# Patient Record
Sex: FEMALE | Race: BLACK OR AFRICAN AMERICAN | NOT HISPANIC OR LATINO | Employment: OTHER | URBAN - METROPOLITAN AREA
[De-identification: names, ages, dates, MRNs, and addresses within clinical notes are randomized per-mention and may not be internally consistent; named-entity substitution may affect disease eponyms.]

---

## 2023-05-18 LAB
ALANINE AMINOTRANSFERASE (SGPT) (U/L) IN SER/PLAS: 13 U/L (ref 7–45)
ALBUMIN (G/DL) IN SER/PLAS: 4.1 G/DL (ref 3.4–5)
ALKALINE PHOSPHATASE (U/L) IN SER/PLAS: 45 U/L (ref 33–136)
ANION GAP IN SER/PLAS: 13 MMOL/L (ref 10–20)
ASPARTATE AMINOTRANSFERASE (SGOT) (U/L) IN SER/PLAS: 18 U/L (ref 9–39)
BILIRUBIN TOTAL (MG/DL) IN SER/PLAS: 0.6 MG/DL (ref 0–1.2)
CALCIDIOL (25 OH VITAMIN D3) (NG/ML) IN SER/PLAS: 116 NG/ML
CALCIUM (MG/DL) IN SER/PLAS: 9.5 MG/DL (ref 8.6–10.6)
CARBON DIOXIDE, TOTAL (MMOL/L) IN SER/PLAS: 27 MMOL/L (ref 21–32)
CHLORIDE (MMOL/L) IN SER/PLAS: 105 MMOL/L (ref 98–107)
CHOLESTEROL (MG/DL) IN SER/PLAS: 210 MG/DL (ref 0–199)
CHOLESTEROL IN HDL (MG/DL) IN SER/PLAS: 53.4 MG/DL
CHOLESTEROL/HDL RATIO: 3.9
CREATININE (MG/DL) IN SER/PLAS: 0.62 MG/DL (ref 0.5–1.05)
ERYTHROCYTE DISTRIBUTION WIDTH (RATIO) BY AUTOMATED COUNT: 13.4 % (ref 11.5–14.5)
ERYTHROCYTE MEAN CORPUSCULAR HEMOGLOBIN CONCENTRATION (G/DL) BY AUTOMATED: 32.4 G/DL (ref 32–36)
ERYTHROCYTE MEAN CORPUSCULAR VOLUME (FL) BY AUTOMATED COUNT: 96 FL (ref 80–100)
ERYTHROCYTES (10*6/UL) IN BLOOD BY AUTOMATED COUNT: 4.65 X10E12/L (ref 4–5.2)
ESTIMATED AVERAGE GLUCOSE FOR HBA1C: 128 MG/DL
GFR FEMALE: 90 ML/MIN/1.73M2
GLUCOSE (MG/DL) IN SER/PLAS: 95 MG/DL (ref 74–99)
HEMATOCRIT (%) IN BLOOD BY AUTOMATED COUNT: 44.5 % (ref 36–46)
HEMOGLOBIN (G/DL) IN BLOOD: 14.4 G/DL (ref 12–16)
HEMOGLOBIN A1C/HEMOGLOBIN TOTAL IN BLOOD: 6.1 %
LDL: 124 MG/DL (ref 0–99)
LEUKOCYTES (10*3/UL) IN BLOOD BY AUTOMATED COUNT: 7 X10E9/L (ref 4.4–11.3)
NRBC (PER 100 WBCS) BY AUTOMATED COUNT: 0 /100 WBC (ref 0–0)
PLATELETS (10*3/UL) IN BLOOD AUTOMATED COUNT: 160 X10E9/L (ref 150–450)
POTASSIUM (MMOL/L) IN SER/PLAS: 3.7 MMOL/L (ref 3.5–5.3)
PROTEIN TOTAL: 7.5 G/DL (ref 6.4–8.2)
SODIUM (MMOL/L) IN SER/PLAS: 141 MMOL/L (ref 136–145)
TRIGLYCERIDE (MG/DL) IN SER/PLAS: 162 MG/DL (ref 0–149)
UREA NITROGEN (MG/DL) IN SER/PLAS: 12 MG/DL (ref 6–23)
VLDL: 32 MG/DL (ref 0–40)

## 2024-01-11 ENCOUNTER — OFFICE VISIT (OUTPATIENT)
Dept: OPHTHALMOLOGY | Facility: CLINIC | Age: 79
End: 2024-01-11
Payer: MEDICARE

## 2024-01-11 DIAGNOSIS — H40.1133 PRIMARY OPEN-ANGLE GLAUCOMA, BILATERAL, SEVERE STAGE: Primary | ICD-10-CM

## 2024-01-11 PROCEDURE — 99214 OFFICE O/P EST MOD 30 MIN: CPT | Performed by: OPHTHALMOLOGY

## 2024-01-11 ASSESSMENT — CONF VISUAL FIELD
OS_INFERIOR_NASAL_RESTRICTION: 1
OS_INFERIOR_TEMPORAL_RESTRICTION: 1
OD_SUPERIOR_NASAL_RESTRICTION: 1
OS_SUPERIOR_TEMPORAL_RESTRICTION: 2
OD_SUPERIOR_TEMPORAL_RESTRICTION: 1
OS_SUPERIOR_NASAL_RESTRICTION: 1

## 2024-01-11 ASSESSMENT — SLIT LAMP EXAM - LIDS
COMMENTS: GOOD POSITION
COMMENTS: GOOD POSITION

## 2024-01-11 ASSESSMENT — EXTERNAL EXAM - RIGHT EYE: OD_EXAM: NORMAL

## 2024-01-11 ASSESSMENT — PACHYMETRY
OS_CT(UM): 505
OD_CT(UM): 478

## 2024-01-11 ASSESSMENT — CUP TO DISC RATIO
OD_RATIO: 0.9
OS_RATIO: 0.99

## 2024-01-11 ASSESSMENT — VISUAL ACUITY
OS_SC: CF@1FT
METHOD: SNELLEN - LINEAR
OD_SC: 20/60

## 2024-01-11 ASSESSMENT — TONOMETRY
OS_IOP_MMHG: 15
IOP_METHOD: GOLDMANN APPLANATION
OD_IOP_MMHG: 15

## 2024-01-11 ASSESSMENT — ENCOUNTER SYMPTOMS: EYES NEGATIVE: 1

## 2024-01-11 ASSESSMENT — EXTERNAL EXAM - LEFT EYE: OS_EXAM: NORMAL

## 2024-01-11 NOTE — PROGRESS NOTES
Visual Acuity (Snellen - Linear)         Right Left    Dist sc 20/60 CF@1FT          Tonometry       Tonometry (Goldmann Applanation, 11:04 AM)         Right Left    Pressure 15 15                  Assessment/Plan   Last dilated: 22  Last testin23    1.  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  Pt states that she had cataract and glaucoma surgery, but no internal osteum nor MIGs implant/incision in TM seen.  No trab seen either.  After stopping brimonidine, the follicles are improved.   IOP measurements are VERY sensitive to blepharospasm and digital pressure.      Now that she is off rhopressa and off preservatives, her eyes are much more comfortable.  The conj thickening has nearly resolved and redness is much improved.  Pt did not have SLT b/c she was scared to have it and wanted to see if she got her systemic BP under control if the IOP would improve.  Pt has a lot medication allergies and is reluctant for SLT.         IOP is elevated (MD confirmed) and not controlled given the very thin CCT.  Re-discussed options 1) CPM, 2) SLT.  R/B/A reviewed.  Pt refuses SLT today.        Plan:  cont cosopt PF OU BID             cont zioptan OU QHS             f/u 1 months to re-check IOP     2.  Pseudophakia (PCIOL) OD / Cataract OS:  no PCO seen; not visually significant OS      Plan;  monitor

## 2024-02-08 ENCOUNTER — OFFICE VISIT (OUTPATIENT)
Dept: OPHTHALMOLOGY | Facility: CLINIC | Age: 79
End: 2024-02-08
Payer: MEDICARE

## 2024-02-08 DIAGNOSIS — H40.1133 PRIMARY OPEN-ANGLE GLAUCOMA, BILATERAL, SEVERE STAGE: Primary | ICD-10-CM

## 2024-02-08 PROCEDURE — 99214 OFFICE O/P EST MOD 30 MIN: CPT | Performed by: OPHTHALMOLOGY

## 2024-02-08 RX ORDER — DORZOLAMIDE HYDROCHLORIDE AND TIMOLOL MALEATE 20; 5 MG/ML; MG/ML
SOLUTION/ DROPS OPHTHALMIC
COMMUNITY

## 2024-02-08 RX ORDER — TAFLUPROST OPTHALMIC 0 MG/.3ML
SOLUTION/ DROPS OPHTHALMIC
COMMUNITY
Start: 2022-01-19 | End: 2024-03-18 | Stop reason: SDUPTHER

## 2024-02-08 ASSESSMENT — CUP TO DISC RATIO
OS_RATIO: 0.99
OD_RATIO: 0.9

## 2024-02-08 ASSESSMENT — ENCOUNTER SYMPTOMS
CONSTITUTIONAL NEGATIVE: 0
ALLERGIC/IMMUNOLOGIC NEGATIVE: 0
CARDIOVASCULAR NEGATIVE: 0
PSYCHIATRIC NEGATIVE: 0
ENDOCRINE NEGATIVE: 0
EYES NEGATIVE: 1
MUSCULOSKELETAL NEGATIVE: 0
NEUROLOGICAL NEGATIVE: 0
GASTROINTESTINAL NEGATIVE: 0
HEMATOLOGIC/LYMPHATIC NEGATIVE: 0
RESPIRATORY NEGATIVE: 0

## 2024-02-08 ASSESSMENT — SLIT LAMP EXAM - LIDS
COMMENTS: GOOD POSITION
COMMENTS: GOOD POSITION

## 2024-02-08 ASSESSMENT — TONOMETRY
OS_IOP_MMHG: 19
OD_IOP_MMHG: 15
IOP_METHOD: GOLDMANN APPLANATION

## 2024-02-08 ASSESSMENT — VISUAL ACUITY
METHOD: SNELLEN - LINEAR
OS_SC: CF AT 1'
OD_SC: 20/60

## 2024-02-08 ASSESSMENT — EXTERNAL EXAM - LEFT EYE: OS_EXAM: NORMAL

## 2024-02-08 ASSESSMENT — CONF VISUAL FIELD
OD_SUPERIOR_TEMPORAL_RESTRICTION: 1
OS_SUPERIOR_NASAL_RESTRICTION: 1
OD_SUPERIOR_NASAL_RESTRICTION: 1
OS_INFERIOR_TEMPORAL_RESTRICTION: 1
OS_INFERIOR_NASAL_RESTRICTION: 1
OS_SUPERIOR_TEMPORAL_RESTRICTION: 2

## 2024-02-08 ASSESSMENT — EXTERNAL EXAM - RIGHT EYE: OD_EXAM: NORMAL

## 2024-02-08 ASSESSMENT — PACHYMETRY
OS_CT(UM): 505
OD_CT(UM): 478

## 2024-02-08 NOTE — PROGRESS NOTES
Visual Acuity (Snellen - Linear)         Right Left    Dist sc 20/60 CF at 1'          Tonometry       Tonometry (Goldmann Applanation, 10:51 AM)         Right Left    Pressure 15 19   Double checked with tonopen (tonopen read )                 Assessment/Plan   Last dilated: 22  Last testin23    1.  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  Pt states that she had cataract and glaucoma surgery, but no internal osteum nor MIGs implant/incision in TM seen.  No trab seen either.  After stopping brimonidine, the follicles are improved.   IOP measurements are VERY sensitive to blepharospasm and digital pressure.      Now that she is off rhopressa and off preservatives, her eyes are much more comfortable.  The conj thickening has nearly resolved and redness is much improved.  Pt did not have SLT b/c she was scared to have it and wanted to see if she got her systemic BP under control if the IOP would improve.  Pt has a lot medication allergies and is reluctant for SLT.         IOP is elevated (MD confirmed) and not controlled given the very thin CCT.  Re-discussed options 1) CPM, 2) SLT.  R/B/A reviewed.        Plan:  cont cosopt PF OU BID                cont zioptan OU QHS                f/u 1 months to re-check IOP     2.  Pseudophakia (PCIOL) OD / Cataract OS:  no PCO seen; not visually significant OS      Plan:  monitor

## 2024-03-18 ENCOUNTER — OFFICE VISIT (OUTPATIENT)
Dept: OPHTHALMOLOGY | Facility: CLINIC | Age: 79
End: 2024-03-18
Payer: MEDICARE

## 2024-03-18 ENCOUNTER — PHARMACY VISIT (OUTPATIENT)
Dept: PHARMACY | Facility: CLINIC | Age: 79
End: 2024-03-18
Payer: COMMERCIAL

## 2024-03-18 DIAGNOSIS — H40.1133 PRIMARY OPEN-ANGLE GLAUCOMA, BILATERAL, SEVERE STAGE: Primary | ICD-10-CM

## 2024-03-18 PROCEDURE — RXMED WILLOW AMBULATORY MEDICATION CHARGE

## 2024-03-18 PROCEDURE — 65855 TRABECULOPLASTY LASER SURG: CPT | Mod: LEFT SIDE | Performed by: OPHTHALMOLOGY

## 2024-03-18 RX ORDER — PREDNISOLONE ACETATE 10 MG/ML
1 SUSPENSION/ DROPS OPHTHALMIC 4 TIMES DAILY
Qty: 5 ML | Refills: 1 | Status: SHIPPED | OUTPATIENT
Start: 2024-03-18 | End: 2024-03-18 | Stop reason: SDUPTHER

## 2024-03-18 RX ORDER — PREDNISOLONE ACETATE 10 MG/ML
1 SUSPENSION/ DROPS OPHTHALMIC 4 TIMES DAILY
Qty: 5 ML | Refills: 1 | Status: SHIPPED | OUTPATIENT
Start: 2024-03-18 | End: 2024-04-12

## 2024-03-18 NOTE — PROGRESS NOTES
Not recorded       Assessment/Plan   Last dilated: 22  Last testin23    1.  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  Pt states that she had cataract and glaucoma surgery, but no internal osteum nor MIGs implant/incision in TM seen.  No trab seen either.  After stopping brimonidine, the follicles are improved.   IOP measurements are VERY sensitive to blepharospasm and digital pressure.      Now that she is off rhopressa and off preservatives, her eyes are much more comfortable.  The conj thickening has nearly resolved and redness is much improved.  Pt did not have SLT b/c she was scared to have it and wanted to see if she got her systemic BP under control if the IOP would improve.  Pt has a lot medication allergies and is reluctant for SLT.         IOP is elevated (MD confirmed) and not controlled given the very thin CCT.  Re-discussed options 1) CPM, 2) SLT.  R/B/A reviewed.        Plan:  cont cosopt PF OU BID                cont zioptan OU QHS                proceed with SLT OS today 3/18/24 pre-laser IOP = 19 mmHg    2.  Pseudophakia (PCIOL) OD / Cataract OS:  no PCO seen; not visually significant OS      Plan:  monitor

## 2024-04-15 ENCOUNTER — OFFICE VISIT (OUTPATIENT)
Dept: OPHTHALMOLOGY | Facility: CLINIC | Age: 79
End: 2024-04-15
Payer: MEDICARE

## 2024-04-15 DIAGNOSIS — Z96.1 PSEUDOPHAKIA OF BOTH EYES: ICD-10-CM

## 2024-04-15 DIAGNOSIS — H40.1133 PRIMARY OPEN-ANGLE GLAUCOMA, BILATERAL, SEVERE STAGE: Primary | ICD-10-CM

## 2024-04-15 PROCEDURE — 99213 OFFICE O/P EST LOW 20 MIN: CPT | Performed by: OPHTHALMOLOGY

## 2024-04-15 ASSESSMENT — ENCOUNTER SYMPTOMS
PSYCHIATRIC NEGATIVE: 0
CARDIOVASCULAR NEGATIVE: 0
EYES NEGATIVE: 1
GASTROINTESTINAL NEGATIVE: 0
HEMATOLOGIC/LYMPHATIC NEGATIVE: 0
ENDOCRINE NEGATIVE: 0
ALLERGIC/IMMUNOLOGIC NEGATIVE: 0
RESPIRATORY NEGATIVE: 0
CONSTITUTIONAL NEGATIVE: 0
MUSCULOSKELETAL NEGATIVE: 0
NEUROLOGICAL NEGATIVE: 0

## 2024-04-15 ASSESSMENT — PACHYMETRY
OD_CT(UM): 478
OS_CT(UM): 505

## 2024-04-15 ASSESSMENT — CONF VISUAL FIELD
OS_SUPERIOR_NASAL_RESTRICTION: 1
OS_INFERIOR_TEMPORAL_RESTRICTION: 1
OD_SUPERIOR_TEMPORAL_RESTRICTION: 1
OS_SUPERIOR_TEMPORAL_RESTRICTION: 2
OS_INFERIOR_NASAL_RESTRICTION: 1
OD_SUPERIOR_NASAL_RESTRICTION: 1

## 2024-04-15 ASSESSMENT — TONOMETRY
OS_IOP_MMHG: 18
OD_IOP_MMHG: 17
IOP_METHOD: GOLDMANN APPLANATION

## 2024-04-15 ASSESSMENT — VISUAL ACUITY
OS_SC: CF @ 1FT
OD_SC: 20/50-1
METHOD: SNELLEN - LINEAR

## 2024-04-15 NOTE — PROGRESS NOTES
Visual Acuity (Snellen - Linear)         Right Left    Dist sc 20/50-1 CF @ 1ft    Dist ph sc NI           Tonometry       Tonometry (Goldmann Applanation, 11:22 AM)         Right Left    Pressure 17 18                  Assessment/Plan     Not recorded       Assessment/Plan   Last dilated: 22  Last testin23    1.  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  Pt states that she had cataract and glaucoma surgery, but no internal osteum nor MIGs implant/incision in TM seen.  No trab seen either.  After stopping brimonidine, the follicles are improved.   IOP measurements are VERY sensitive to blepharospasm and digital pressure.      Now that she is off rhopressa and off preservatives, her eyes are much more comfortable.  The conj thickening has nearly resolved and redness is much improved.  Pt did not have SLT b/c she was scared to have it and wanted to see if she got her systemic BP under control if the IOP would improve.  Pt has a lot medication allergies and is reluctant for SLT.         SLT OS today 3/18/24 pre-laser IOP = 19 mmHg -> 1 mmHg early effect        Plan:  cont cosopt PF OU BID                cont zioptan OU QHS                f/u 1 month to see full effect of SLT OS    2.  Pseudophakia (PCIOL) OD / Cataract OS:  no PCO seen; not visually significant OS      Plan:  monitor

## 2024-05-07 ENCOUNTER — APPOINTMENT (OUTPATIENT)
Dept: PRIMARY CARE | Facility: CLINIC | Age: 79
End: 2024-05-07
Payer: MEDICARE

## 2024-05-07 PROBLEM — M25.512 PAIN OF LEFT SHOULDER REGION: Status: RESOLVED | Noted: 2024-05-07 | Resolved: 2024-05-07

## 2024-05-07 PROBLEM — K64.8 INTERNAL HEMORRHOIDS: Status: ACTIVE | Noted: 2024-05-07

## 2024-05-07 PROBLEM — N39.0 URINARY TRACT INFECTION: Status: RESOLVED | Noted: 2024-05-07 | Resolved: 2024-05-07

## 2024-05-07 PROBLEM — R01.1 HEART MURMUR: Status: ACTIVE | Noted: 2024-05-07

## 2024-05-07 PROBLEM — K59.00 CONSTIPATION: Status: ACTIVE | Noted: 2024-05-07

## 2024-05-07 PROBLEM — H25.812 COMBINED FORM OF AGE-RELATED CATARACT, LEFT EYE: Status: ACTIVE | Noted: 2020-08-06

## 2024-05-07 PROBLEM — R19.5 ABNORMAL FECES: Status: ACTIVE | Noted: 2024-05-07

## 2024-05-07 PROBLEM — E78.5 HYPERLIPIDEMIA: Status: ACTIVE | Noted: 2024-05-07

## 2024-05-07 PROBLEM — I34.0 MITRAL VALVE INSUFFICIENCY: Status: ACTIVE | Noted: 2024-05-07

## 2024-05-07 PROBLEM — R07.9 CHEST PAIN: Status: RESOLVED | Noted: 2024-05-07 | Resolved: 2024-05-07

## 2024-05-07 PROBLEM — N95.2 VAGINAL ATROPHY: Status: ACTIVE | Noted: 2024-05-07

## 2024-05-07 PROBLEM — G47.00 INSOMNIA: Status: ACTIVE | Noted: 2024-05-07

## 2024-05-07 PROBLEM — R53.83 FATIGUE: Status: ACTIVE | Noted: 2024-05-07

## 2024-05-07 PROBLEM — Z86.39 HISTORY OF DIABETES MELLITUS: Status: ACTIVE | Noted: 2024-05-07

## 2024-05-07 PROBLEM — R00.2 HEART PALPITATIONS: Status: ACTIVE | Noted: 2024-05-07

## 2024-05-07 PROBLEM — H40.9 END-STAGE GLAUCOMA: Status: ACTIVE | Noted: 2020-08-06

## 2024-05-07 PROBLEM — N39.3 STRESS INCONTINENCE: Status: ACTIVE | Noted: 2024-05-07

## 2024-05-07 PROBLEM — I10 HYPERTENSION: Status: ACTIVE | Noted: 2024-05-07

## 2024-05-07 PROBLEM — M81.0 POST-MENOPAUSAL OSTEOPOROSIS: Status: ACTIVE | Noted: 2024-05-07

## 2024-05-07 PROBLEM — R33.9 URINARY RETENTION: Status: ACTIVE | Noted: 2024-05-07

## 2024-05-07 PROBLEM — E11.9 TYPE 2 DIABETES MELLITUS (MULTI): Status: ACTIVE | Noted: 2024-05-07

## 2024-05-07 RX ORDER — IBUPROFEN 100 MG/5ML
1000 SUSPENSION, ORAL (FINAL DOSE FORM) ORAL DAILY
COMMUNITY

## 2024-05-07 RX ORDER — DOCUSATE SODIUM 100 MG/1
CAPSULE, LIQUID FILLED ORAL
COMMUNITY
Start: 2014-06-27

## 2024-05-07 RX ORDER — NAPROXEN SODIUM 220 MG/1
TABLET, FILM COATED ORAL
COMMUNITY
Start: 2018-03-13

## 2024-05-07 RX ORDER — NIACIN (INOSITOL NIACINATE) 400(500MG)
1 CAPSULE ORAL DAILY
COMMUNITY
Start: 2018-02-13

## 2024-05-07 RX ORDER — PNV NO.95/FERROUS FUM/FOLIC AC 28MG-0.8MG
1 TABLET ORAL DAILY
COMMUNITY
Start: 2018-02-13

## 2024-05-07 RX ORDER — LATANOPROST 50 UG/ML
SOLUTION/ DROPS OPHTHALMIC
COMMUNITY

## 2024-05-07 RX ORDER — HYDROCHLOROTHIAZIDE 12.5 MG/1
1 CAPSULE ORAL DAILY
COMMUNITY

## 2024-05-07 RX ORDER — TRAVOPROST OPHTHALMIC SOLUTION 0.04 MG/ML
SOLUTION OPHTHALMIC
COMMUNITY
Start: 2023-04-17

## 2024-05-07 RX ORDER — CHOLECALCIFEROL (VITAMIN D3) 25 MCG
1 TABLET ORAL DAILY
COMMUNITY

## 2024-05-07 RX ORDER — MULTIVITAMIN
TABLET ORAL
COMMUNITY

## 2024-05-30 ENCOUNTER — OFFICE VISIT (OUTPATIENT)
Dept: OPHTHALMOLOGY | Facility: CLINIC | Age: 79
End: 2024-05-30
Payer: MEDICARE

## 2024-05-30 ENCOUNTER — APPOINTMENT (OUTPATIENT)
Dept: OPHTHALMOLOGY | Facility: CLINIC | Age: 79
End: 2024-05-30
Payer: MEDICARE

## 2024-05-30 DIAGNOSIS — H40.1133 PRIMARY OPEN-ANGLE GLAUCOMA, BILATERAL, SEVERE STAGE: Primary | ICD-10-CM

## 2024-05-30 DIAGNOSIS — Z96.1 PSEUDOPHAKIA OF BOTH EYES: ICD-10-CM

## 2024-05-30 PROCEDURE — 99213 OFFICE O/P EST LOW 20 MIN: CPT | Performed by: OPHTHALMOLOGY

## 2024-05-30 ASSESSMENT — ENCOUNTER SYMPTOMS
GASTROINTESTINAL NEGATIVE: 0
MUSCULOSKELETAL NEGATIVE: 0
ENDOCRINE NEGATIVE: 0
CARDIOVASCULAR NEGATIVE: 0
NEUROLOGICAL NEGATIVE: 0
EYES NEGATIVE: 1
RESPIRATORY NEGATIVE: 0
CONSTITUTIONAL NEGATIVE: 0
PSYCHIATRIC NEGATIVE: 0
HEMATOLOGIC/LYMPHATIC NEGATIVE: 0
ALLERGIC/IMMUNOLOGIC NEGATIVE: 0

## 2024-05-30 ASSESSMENT — PACHYMETRY
OS_CT(UM): 505
OD_CT(UM): 478

## 2024-05-30 ASSESSMENT — VISUAL ACUITY
METHOD: SNELLEN - LINEAR
OD_SC: 20/50
OS_SC: CF@1FT

## 2024-05-30 ASSESSMENT — TONOMETRY
OS_IOP_MMHG: 15
IOP_METHOD: GOLDMANN APPLANATION
OD_IOP_MMHG: 14

## 2024-05-30 NOTE — PROGRESS NOTES
Visual Acuity (Snellen - Linear)         Right Left    Dist sc 20/50 CF@1FT    Dist ph sc NI           Tonometry       Tonometry (Goldmann Applanation, 9:49 AM)         Right Left    Pressure 14 15                  Assessment/Plan   Last dilated: 22  Last testin23    1.  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  Pt states that she had cataract and glaucoma surgery, but no internal osteum nor MIGs implant/incision in TM seen.  No trab seen either.  After stopping brimonidine, the follicles are improved.   IOP measurements are VERY sensitive to blepharospasm and digital pressure.      Now that she is off rhopressa and off preservatives, her eyes are much more comfortable.  The conj thickening has nearly resolved and redness is much improved.  Pt did not have SLT b/c she was scared to have it and wanted to see if she got her systemic BP under control if the IOP would improve.  Pt has a lot medication allergies and is reluctant for SLT.         SLT OS today 3/18/24 pre-laser IOP = 19 mmHg -> 4 mmHg effect.  IOPs are now well controlled      Plan:  cont cosopt PF OU BID                cont zioptan OU QHS                f/u 3 month (HVF, dilation, RNFL)    2.  Pseudophakia (PCIOL) OD / Cataract OS:  no PCO seen; not visually significant OS      Plan:  monitor

## 2024-09-10 ENCOUNTER — APPOINTMENT (OUTPATIENT)
Dept: PRIMARY CARE | Facility: CLINIC | Age: 79
End: 2024-09-10
Payer: MEDICARE

## 2024-09-10 ENCOUNTER — LAB (OUTPATIENT)
Dept: LAB | Facility: LAB | Age: 79
End: 2024-09-10
Payer: MEDICARE

## 2024-09-10 VITALS
WEIGHT: 146 LBS | HEIGHT: 67 IN | HEART RATE: 92 BPM | BODY MASS INDEX: 22.91 KG/M2 | DIASTOLIC BLOOD PRESSURE: 94 MMHG | SYSTOLIC BLOOD PRESSURE: 157 MMHG

## 2024-09-10 DIAGNOSIS — E55.9 VITAMIN D DEFICIENCY: ICD-10-CM

## 2024-09-10 DIAGNOSIS — I10 PRIMARY HYPERTENSION: ICD-10-CM

## 2024-09-10 DIAGNOSIS — R01.1 HEART MURMUR: ICD-10-CM

## 2024-09-10 DIAGNOSIS — Z00.00 MEDICARE ANNUAL WELLNESS VISIT, SUBSEQUENT: ICD-10-CM

## 2024-09-10 DIAGNOSIS — E78.49 OTHER HYPERLIPIDEMIA: ICD-10-CM

## 2024-09-10 DIAGNOSIS — I34.0 NONRHEUMATIC MITRAL VALVE REGURGITATION: ICD-10-CM

## 2024-09-10 DIAGNOSIS — E11.69 TYPE 2 DIABETES MELLITUS WITH OTHER SPECIFIED COMPLICATION, WITHOUT LONG-TERM CURRENT USE OF INSULIN (MULTI): ICD-10-CM

## 2024-09-10 DIAGNOSIS — Z00.00 MEDICARE ANNUAL WELLNESS VISIT, SUBSEQUENT: Primary | ICD-10-CM

## 2024-09-10 DIAGNOSIS — Z00.00 ROUTINE GENERAL MEDICAL EXAMINATION AT HEALTH CARE FACILITY: ICD-10-CM

## 2024-09-10 LAB
25(OH)D3 SERPL-MCNC: 94 NG/ML (ref 30–100)
ALBUMIN SERPL BCP-MCNC: 4.6 G/DL (ref 3.4–5)
ALP SERPL-CCNC: 55 U/L (ref 33–136)
ALT SERPL W P-5'-P-CCNC: 16 U/L (ref 7–45)
ANION GAP SERPL CALC-SCNC: 15 MMOL/L (ref 10–20)
APPEARANCE UR: CLEAR
AST SERPL W P-5'-P-CCNC: 18 U/L (ref 9–39)
BILIRUB SERPL-MCNC: 0.5 MG/DL (ref 0–1.2)
BILIRUB UR STRIP.AUTO-MCNC: NEGATIVE MG/DL
BUN SERPL-MCNC: 11 MG/DL (ref 6–23)
CALCIUM SERPL-MCNC: 9.6 MG/DL (ref 8.6–10.6)
CHLORIDE SERPL-SCNC: 101 MMOL/L (ref 98–107)
CHOLEST SERPL-MCNC: 231 MG/DL (ref 0–199)
CHOLESTEROL/HDL RATIO: 4.1
CO2 SERPL-SCNC: 27 MMOL/L (ref 21–32)
COLOR UR: COLORLESS
CREAT SERPL-MCNC: 0.74 MG/DL (ref 0.5–1.05)
CREAT UR-MCNC: 21 MG/DL (ref 20–320)
EGFRCR SERPLBLD CKD-EPI 2021: 81 ML/MIN/1.73M*2
ERYTHROCYTE [DISTWIDTH] IN BLOOD BY AUTOMATED COUNT: 13.3 % (ref 11.5–14.5)
EST. AVERAGE GLUCOSE BLD GHB EST-MCNC: 140 MG/DL
GLUCOSE SERPL-MCNC: 124 MG/DL (ref 74–99)
GLUCOSE UR STRIP.AUTO-MCNC: NORMAL MG/DL
HBA1C MFR BLD: 6.5 %
HCT VFR BLD AUTO: 44.2 % (ref 36–46)
HDLC SERPL-MCNC: 55.8 MG/DL
HGB BLD-MCNC: 14.4 G/DL (ref 12–16)
KETONES UR STRIP.AUTO-MCNC: NEGATIVE MG/DL
LDLC SERPL CALC-MCNC: 141 MG/DL
LEUKOCYTE ESTERASE UR QL STRIP.AUTO: NEGATIVE
MCH RBC QN AUTO: 30.9 PG (ref 26–34)
MCHC RBC AUTO-ENTMCNC: 32.6 G/DL (ref 32–36)
MCV RBC AUTO: 95 FL (ref 80–100)
MICROALBUMIN UR-MCNC: 27.9 MG/L
MICROALBUMIN/CREAT UR: 132.9 UG/MG CREAT
NITRITE UR QL STRIP.AUTO: NEGATIVE
NON HDL CHOLESTEROL: 175 MG/DL (ref 0–149)
NRBC BLD-RTO: 0 /100 WBCS (ref 0–0)
PH UR STRIP.AUTO: 7 [PH]
PLATELET # BLD AUTO: 184 X10*3/UL (ref 150–450)
POTASSIUM SERPL-SCNC: 4 MMOL/L (ref 3.5–5.3)
PROT SERPL-MCNC: 7.9 G/DL (ref 6.4–8.2)
PROT UR STRIP.AUTO-MCNC: NEGATIVE MG/DL
RBC # BLD AUTO: 4.66 X10*6/UL (ref 4–5.2)
RBC # UR STRIP.AUTO: NEGATIVE /UL
SODIUM SERPL-SCNC: 139 MMOL/L (ref 136–145)
SP GR UR STRIP.AUTO: 1
TRIGL SERPL-MCNC: 172 MG/DL (ref 0–149)
TSH SERPL-ACNC: 3.4 MIU/L (ref 0.44–3.98)
UROBILINOGEN UR STRIP.AUTO-MCNC: NORMAL MG/DL
VLDL: 34 MG/DL (ref 0–40)
WBC # BLD AUTO: 7.7 X10*3/UL (ref 4.4–11.3)

## 2024-09-10 PROCEDURE — 3080F DIAST BP >= 90 MM HG: CPT | Performed by: INTERNAL MEDICINE

## 2024-09-10 PROCEDURE — 1160F RVW MEDS BY RX/DR IN RCRD: CPT | Performed by: INTERNAL MEDICINE

## 2024-09-10 PROCEDURE — 1124F ACP DISCUSS-NO DSCNMKR DOCD: CPT | Performed by: INTERNAL MEDICINE

## 2024-09-10 PROCEDURE — G0439 PPPS, SUBSEQ VISIT: HCPCS | Performed by: INTERNAL MEDICINE

## 2024-09-10 PROCEDURE — 1159F MED LIST DOCD IN RCRD: CPT | Performed by: INTERNAL MEDICINE

## 2024-09-10 PROCEDURE — 36415 COLL VENOUS BLD VENIPUNCTURE: CPT

## 2024-09-10 PROCEDURE — 3077F SYST BP >= 140 MM HG: CPT | Performed by: INTERNAL MEDICINE

## 2024-09-10 PROCEDURE — 99214 OFFICE O/P EST MOD 30 MIN: CPT | Performed by: INTERNAL MEDICINE

## 2024-09-10 PROCEDURE — 1170F FXNL STATUS ASSESSED: CPT | Performed by: INTERNAL MEDICINE

## 2024-09-10 PROCEDURE — 1036F TOBACCO NON-USER: CPT | Performed by: INTERNAL MEDICINE

## 2024-09-10 RX ORDER — HYDROCHLOROTHIAZIDE 12.5 MG/1
12.5 CAPSULE ORAL DAILY
Qty: 90 CAPSULE | Refills: 1 | Status: SHIPPED | OUTPATIENT
Start: 2024-09-10

## 2024-09-10 ASSESSMENT — ACTIVITIES OF DAILY LIVING (ADL)
MANAGING_FINANCES: INDEPENDENT
DRESSING: INDEPENDENT
BATHING: INDEPENDENT
BATHING: INDEPENDENT
DOING_HOUSEWORK: INDEPENDENT
GROCERY_SHOPPING: INDEPENDENT
DRESSING: INDEPENDENT
TAKING_MEDICATION: INDEPENDENT

## 2024-09-10 NOTE — PROGRESS NOTES
"Subjective   Reason for Visit: Tricia Campo is an 79 y.o. female here for a Medicare Wellness visit.               HPI  Patient is an 79-year-old -American female who comes today for subsequent annual Medicare wellness exam and lab work.  She is currently not on any antihypertensive Rx and is only on vits/supplements and eye drops.  Patient has been evaluated by cardiology for history of mitral valve prolapse and she also follows up with ophthalmology for history of glaucoma.  Patient declines mammogram, immunizations, bone DEXA and last colonoscopy done in October 2022 revealed nonbleeding internal hemorrhoids.  Patient lives with her elderly , she ambulates without any aid or assistance, cooks minimally.  She claims ambulatory blood pressure readings have been in the 120s to 130s systolic.  No history of fever, chills, chest pain, shortness of breath, cough, palpitations, syncope, abdominal pain, nausea, vomiting, diarrhea, melena, rectal bleeding, dysuria, hematuria, weakness or numbness reported.  Patient denies feeling anxious or depressed and she has always had some sleep issues.  Patient Care Team:  Acacia Webber MD as PCP - General     Review of Systems  As per [facility].  Left eye vision is significantly impaired and right eye is also getting worse.  Objective   Vitals:  BP (!) 157/94   Pulse 92   Ht 1.689 m (5' 6.5\")   Wt 66.2 kg (146 lb)   BMI 23.21 kg/m²       Physical Exam  General - Well developed, well appearing, elderly black female in no acute respiratory distress  Eyes - no pallor or icterus, normal extraocular movements  ENT - normal external auditory canals and tympanic membranes, throat clear with no exudates  Neck - No JVD, thyromegaly or lymphadenopathy  Lungs - no respiratory distress and lungs clear to auscultation bilaterally  Heart - normal S1, S2 with normal heart rate, rhythm and faint systolic murmur noted   Abdomen - soft, nontender with no masses or " organomegaly,  Extremities - no cyanosis or pedal edema  Neuro - grossly normal neuro exam with no focal neuro deficits  Psych - normal mental status, mood and affect   Skin - no rashes or ulcers  MSK - normal gait with grossly normal ROM of major joints   Assessment & Plan  Medicare annual wellness visit, subsequent    Orders:    Comprehensive Metabolic Panel; Future    Lipid Panel; Future    TSH with reflex to Free T4 if abnormal; Future    Urinalysis with Reflex Microscopic; Future    Type 2 diabetes mellitus with other specified complication, without long-term current use of insulin (Multi)    Orders:    Albumin-Creatinine Ratio, Urine Random; Future    Hemoglobin A1C; Future    Heart murmur         Primary hypertension    Orders:    CBC; Future    hydroCHLOROthiazide (Microzide) 12.5 mg capsule; Take 1 capsule (12.5 mg) by mouth once daily.    Other hyperlipidemia         Nonrheumatic mitral valve regurgitation         Routine general medical examination at health care facility         Vitamin D deficiency    Orders:    Vitamin D 25-Hydroxy,Total (for eval of Vitamin D levels); Future    1.  Subsequent annual Medicare wellness exam-routine labs will be ordered, patient declines rest of [facility] due to advanced age including immunizations  2.  Type 2 diabetes-hemoglobin A1c, urine microalbumin and CMP will be checked, patient is not on any drug treatment  3.  Heart murmur, history of mitral valve prolapse, mitral insufficiency-patient is following up with cardiology  4.  Hypertension-blood pressure is elevated, hydrochlorothiazide refill sent to the pharmacy and I have advised patient to return in 3 months for recheck of her blood pressure  5.  Hyperlipidemia-fasting lipids will be checked, patient is not on any drug treatment  6.  History of vitamin D deficiency-last level was over 100, vitamin D 25-hydroxy level will be checked  Follow-up in 3 months to recheck blood pressure.  30 minutes spent rooming the  patient, reviewing records, eliciting history, examining patient, counseling, coordination of care and in documentation.  This note was partially generated using the Dragon voice recognition system. There may be some incorrect words, spelling and punctuation errors that were not corrected prior to committing the note to the patient's medical record.

## 2024-09-10 NOTE — ASSESSMENT & PLAN NOTE
Orders:    CBC; Future    hydroCHLOROthiazide (Microzide) 12.5 mg capsule; Take 1 capsule (12.5 mg) by mouth once daily.

## 2024-09-24 ENCOUNTER — OFFICE VISIT (OUTPATIENT)
Dept: CARDIOLOGY | Facility: HOSPITAL | Age: 79
End: 2024-09-24
Payer: MEDICARE

## 2024-09-24 VITALS
WEIGHT: 146 LBS | HEIGHT: 67 IN | BODY MASS INDEX: 22.91 KG/M2 | SYSTOLIC BLOOD PRESSURE: 175 MMHG | HEART RATE: 96 BPM | DIASTOLIC BLOOD PRESSURE: 105 MMHG

## 2024-09-24 DIAGNOSIS — I10 HYPERTENSION, UNSPECIFIED TYPE: Primary | ICD-10-CM

## 2024-09-24 LAB
ATRIAL RATE: 96 BPM
P AXIS: 73 DEGREES
P OFFSET: 214 MS
P ONSET: 148 MS
PR INTERVAL: 148 MS
Q ONSET: 222 MS
QRS COUNT: 16 BEATS
QRS DURATION: 70 MS
QT INTERVAL: 318 MS
QTC CALCULATION(BAZETT): 401 MS
QTC FREDERICIA: 372 MS
R AXIS: -23 DEGREES
T AXIS: 81 DEGREES
T OFFSET: 381 MS
VENTRICULAR RATE: 96 BPM

## 2024-09-24 PROCEDURE — 1160F RVW MEDS BY RX/DR IN RCRD: CPT | Performed by: INTERNAL MEDICINE

## 2024-09-24 PROCEDURE — 3080F DIAST BP >= 90 MM HG: CPT | Performed by: INTERNAL MEDICINE

## 2024-09-24 PROCEDURE — 99214 OFFICE O/P EST MOD 30 MIN: CPT | Performed by: INTERNAL MEDICINE

## 2024-09-24 PROCEDURE — 93010 ELECTROCARDIOGRAM REPORT: CPT | Performed by: INTERNAL MEDICINE

## 2024-09-24 PROCEDURE — 1036F TOBACCO NON-USER: CPT | Performed by: INTERNAL MEDICINE

## 2024-09-24 PROCEDURE — 93005 ELECTROCARDIOGRAM TRACING: CPT | Performed by: INTERNAL MEDICINE

## 2024-09-24 PROCEDURE — 1159F MED LIST DOCD IN RCRD: CPT | Performed by: INTERNAL MEDICINE

## 2024-09-24 PROCEDURE — 3077F SYST BP >= 140 MM HG: CPT | Performed by: INTERNAL MEDICINE

## 2024-09-24 NOTE — PROGRESS NOTES
Subjective:  Patient returns for a routine annual follow-up.  She generally says she has been clinically stable over the past year.  She has not had any hospitalizations.  She denies any other new health concerns.    She is getting around at a reasonable activity level without any chest discomfort or dyspnea.  She denies any recurrent palpitations.  She has not had any recent cardiovascular testing but did have a negative stress MRI about 6 years ago.    She overall is generally happy and comfortable with how she is doing.  She has been monitoring her blood pressures with a wrist cuff and says that they have been in reasonable range.  This is on low-dose hydrochlorothiazide.    Objective:  General: Alert, usual delightful self.  HEENT: Unchanged.  Lungs: Clear without crackles.  Cardiac: Distant heart tones without change.  Abdomen: Nontender with normal bowel sounds.  Extremities: No edema.  Skin: No acute rash.  Neuro: Grossly intact.    EKG: Normal sinus rhythm with PACs.  Nonspecific T wave abnormality.    Lipid panel: Cholesterol-231, HDL-56, LDL-141, TG-172.    Impression/plan:  Tricia is generally doing reasonably well at this time.  She is not having any problematic cardiovascular symptoms, so I did not think we needed to proceed with any repeat ischemic workup at this time.    Her blood pressure is certainly far from ideally controlled.  I suspect this potentially could be whitecoat syndrome, but I am not convinced that it necessarily is.  I will have her monitor her blood pressures with a wrist cuff several times a week for the next month.  She will bring her wrist cuff in with her and I will compare it with my upper arm readings.  I do suspect we will probably see that her blood pressure readings remain elevated.   I do think we will need to consider initiating additional antihypertensive therapy.    Her lipid panel certainly looks less than ideal but she has been hesitant to initiate statin therapy  previously.  I will plan on readdressing this once we get her blood pressure under control.    She knows to call for any intercurrent concerns before her next visit.    Patient instructions:    Continue current medications unchanged.    Monitor your blood pressure as directed.    Return to clinic in 1 month.

## 2024-09-26 ENCOUNTER — APPOINTMENT (OUTPATIENT)
Dept: OPHTHALMOLOGY | Facility: CLINIC | Age: 79
End: 2024-09-26
Payer: MEDICARE

## 2024-09-26 DIAGNOSIS — Z96.1 PSEUDOPHAKIA OF BOTH EYES: ICD-10-CM

## 2024-09-26 DIAGNOSIS — H40.1133 PRIMARY OPEN-ANGLE GLAUCOMA, BILATERAL, SEVERE STAGE: Primary | ICD-10-CM

## 2024-09-26 DIAGNOSIS — H40.1132 PRIMARY OPEN ANGLE GLAUCOMA (POAG) OF BOTH EYES, MODERATE STAGE: ICD-10-CM

## 2024-09-26 PROCEDURE — 1036F TOBACCO NON-USER: CPT | Performed by: OPHTHALMOLOGY

## 2024-09-26 PROCEDURE — 99214 OFFICE O/P EST MOD 30 MIN: CPT | Performed by: OPHTHALMOLOGY

## 2024-09-26 PROCEDURE — 92083 EXTENDED VISUAL FIELD XM: CPT | Performed by: OPHTHALMOLOGY

## 2024-09-26 PROCEDURE — 92133 CPTRZD OPH DX IMG PST SGM ON: CPT | Performed by: OPHTHALMOLOGY

## 2024-09-26 ASSESSMENT — CUP TO DISC RATIO
OD_RATIO: 0.9
OS_RATIO: 0.99

## 2024-09-26 ASSESSMENT — TONOMETRY
OD_IOP_MMHG: 14
OS_IOP_MMHG: 16
IOP_METHOD: GOLDMANN APPLANATION

## 2024-09-26 ASSESSMENT — CONF VISUAL FIELD
OS_SUPERIOR_TEMPORAL_RESTRICTION: 2
OS_INFERIOR_TEMPORAL_RESTRICTION: 1
OS_INFERIOR_NASAL_RESTRICTION: 1
OD_SUPERIOR_TEMPORAL_RESTRICTION: 1
OS_SUPERIOR_NASAL_RESTRICTION: 1
OD_SUPERIOR_NASAL_RESTRICTION: 1

## 2024-09-26 ASSESSMENT — VISUAL ACUITY
OD_SC: 20/60-1
METHOD: SNELLEN - LINEAR
CORRECTION_TYPE: GLASSES

## 2024-09-26 ASSESSMENT — PACHYMETRY
OS_CT(UM): 505
OD_CT(UM): 478

## 2024-09-26 ASSESSMENT — EXTERNAL EXAM - RIGHT EYE: OD_EXAM: NORMAL

## 2024-09-26 ASSESSMENT — SLIT LAMP EXAM - LIDS
COMMENTS: GOOD POSITION
COMMENTS: GOOD POSITION

## 2024-09-26 ASSESSMENT — EXTERNAL EXAM - LEFT EYE: OS_EXAM: NORMAL

## 2024-09-26 NOTE — PROGRESS NOTES
Visual Acuity (Snellen - Linear)         Right Left    Dist sc 20/60-1 CF @    Dist ph cc NI       Correction: Glasses          Tonometry       Tonometry (Goldmann Applanation, 10:23 AM)         Right Left    Pressure 14 16                  Assessment/Plan   Last dilated: 9/26/24    1.  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  Pt states that she had cataract and glaucoma surgery, but no internal osteum nor MIGs implant/incision in TM seen.  No trab seen either.  After stopping brimonidine, the follicles are improved.   IOP measurements are VERY sensitive to blepharospasm and digital pressure.      Now that she is off rhopressa and off preservatives, her eyes are much more comfortable.  The conj thickening has nearly resolved and redness is much improved.  Pt did not have SLT b/c she was scared to have it and wanted to see if she got her systemic BP under control if the IOP would improve.  Pt has a lot medication allergies and was reluctant for SLT.         SLT OS 3/18/24 pre-laser IOP = 19 mmHg -> 4 mmHg effect.  IOPs are now well controlled and RNFL and HVF are both stable      Plan:  cont cosopt PF OU BID                cont zioptan OU QHS                f/u 3 month     2.  Pseudophakia (PCIOL) OD / Cataract OS:  no PCO seen; not visually significant OS at this time, but if the VA starts to drop due to increasing density, could consider removal in the future.      Plan:  monitor

## 2024-09-30 DIAGNOSIS — H40.1132 PRIMARY OPEN ANGLE GLAUCOMA (POAG) OF BOTH EYES, MODERATE STAGE: Primary | ICD-10-CM

## 2024-09-30 DIAGNOSIS — H40.1133 PRIMARY OPEN-ANGLE GLAUCOMA, BILATERAL, SEVERE STAGE: ICD-10-CM

## 2024-09-30 RX ORDER — DORZOLAMIDE HYDROCHLORIDE AND TIMOLOL MALEATE 20; 5 MG/ML; MG/ML
1 SOLUTION/ DROPS OPHTHALMIC 2 TIMES DAILY
Qty: 30 ML | Refills: 3 | Status: SHIPPED | OUTPATIENT
Start: 2024-09-30 | End: 2025-09-30

## 2024-10-29 ENCOUNTER — OFFICE VISIT (OUTPATIENT)
Dept: CARDIOLOGY | Facility: HOSPITAL | Age: 79
End: 2024-10-29
Payer: MEDICARE

## 2024-10-29 VITALS
WEIGHT: 144.2 LBS | DIASTOLIC BLOOD PRESSURE: 93 MMHG | BODY MASS INDEX: 23.18 KG/M2 | SYSTOLIC BLOOD PRESSURE: 178 MMHG | HEIGHT: 66 IN | OXYGEN SATURATION: 98 % | HEART RATE: 79 BPM

## 2024-10-29 DIAGNOSIS — E78.2 MIXED HYPERLIPIDEMIA: ICD-10-CM

## 2024-10-29 DIAGNOSIS — I10 HYPERTENSION, UNSPECIFIED TYPE: Primary | ICD-10-CM

## 2024-10-29 PROCEDURE — 1036F TOBACCO NON-USER: CPT | Performed by: NURSE PRACTITIONER

## 2024-10-29 PROCEDURE — 1159F MED LIST DOCD IN RCRD: CPT | Performed by: NURSE PRACTITIONER

## 2024-10-29 PROCEDURE — 3080F DIAST BP >= 90 MM HG: CPT | Performed by: NURSE PRACTITIONER

## 2024-10-29 PROCEDURE — 3077F SYST BP >= 140 MM HG: CPT | Performed by: NURSE PRACTITIONER

## 2024-10-29 PROCEDURE — 99214 OFFICE O/P EST MOD 30 MIN: CPT | Performed by: NURSE PRACTITIONER

## 2024-10-29 PROCEDURE — 1160F RVW MEDS BY RX/DR IN RCRD: CPT | Performed by: NURSE PRACTITIONER

## 2025-01-06 DIAGNOSIS — H40.1133 PRIMARY OPEN-ANGLE GLAUCOMA, BILATERAL, SEVERE STAGE: ICD-10-CM

## 2025-01-06 DIAGNOSIS — H40.1132 PRIMARY OPEN ANGLE GLAUCOMA (POAG) OF BOTH EYES, MODERATE STAGE: ICD-10-CM

## 2025-01-06 RX ORDER — DORZOLAMIDE HYDROCHLORIDE AND TIMOLOL MALEATE 20; 5 MG/ML; MG/ML
1 SOLUTION/ DROPS OPHTHALMIC 2 TIMES DAILY
Qty: 30 ML | Refills: 3 | Status: SHIPPED | OUTPATIENT
Start: 2025-01-06 | End: 2026-01-06

## 2025-01-09 ENCOUNTER — APPOINTMENT (OUTPATIENT)
Dept: OPHTHALMOLOGY | Facility: CLINIC | Age: 80
End: 2025-01-09
Payer: MEDICARE

## 2025-01-09 DIAGNOSIS — Z96.1 PSEUDOPHAKIA OF BOTH EYES: ICD-10-CM

## 2025-01-09 DIAGNOSIS — H40.1133 PRIMARY OPEN-ANGLE GLAUCOMA, BILATERAL, SEVERE STAGE: Primary | ICD-10-CM

## 2025-01-09 RX ORDER — BRIMONIDINE TARTRATE 1 MG/ML
1 SOLUTION/ DROPS OPHTHALMIC 3 TIMES DAILY
Qty: 10 ML | Refills: 11 | Status: SHIPPED | OUTPATIENT
Start: 2025-01-09 | End: 2026-01-09

## 2025-01-09 ASSESSMENT — VISUAL ACUITY
OD_SC: 20/70
METHOD: SNELLEN - LINEAR
OS_SC: CF@FACE
OD_PH_SC: 20/60
CORRECTION_TYPE: GLASSES

## 2025-01-09 ASSESSMENT — CONF VISUAL FIELD
OD_NORMAL: 1
OS_INFERIOR_NASAL_RESTRICTION: 0
OS_SUPERIOR_NASAL_RESTRICTION: 0
OS_SUPERIOR_TEMPORAL_RESTRICTION: 0
OD_INFERIOR_TEMPORAL_RESTRICTION: 0
OD_SUPERIOR_NASAL_RESTRICTION: 0
OD_SUPERIOR_TEMPORAL_RESTRICTION: 0
OS_NORMAL: 1
OD_INFERIOR_NASAL_RESTRICTION: 0
OS_INFERIOR_TEMPORAL_RESTRICTION: 0

## 2025-01-09 ASSESSMENT — GONIOSCOPY
OS_SUPERIOR: D45R 2+ PTM
OS_TEMPORAL: D45R 2+ PTM
OD_INFERIOR: D45R 2+ PTM
OD_TEMPORAL: D45R 2+ PTM
OD_NASAL: D45R 2+ PTM
OS_NASAL: D45R 2+ PTM
OS_INFERIOR: D45R 2+ PTM
OD_SUPERIOR: D45R 2+ PTM

## 2025-01-09 ASSESSMENT — TONOMETRY
IOP_METHOD: GOLDMANN APPLANATION
OS_IOP_MMHG: 19
OD_IOP_MMHG: 15

## 2025-01-09 ASSESSMENT — CUP TO DISC RATIO
OD_RATIO: 0.9
OS_RATIO: 0.99

## 2025-01-09 ASSESSMENT — SLIT LAMP EXAM - LIDS
COMMENTS: GOOD POSITION
COMMENTS: GOOD POSITION

## 2025-01-09 ASSESSMENT — PACHYMETRY
OD_CT(UM): 478
OS_CT(UM): 505

## 2025-01-09 ASSESSMENT — EXTERNAL EXAM - RIGHT EYE: OD_EXAM: NORMAL

## 2025-01-09 ASSESSMENT — EXTERNAL EXAM - LEFT EYE: OS_EXAM: NORMAL

## 2025-01-09 NOTE — PROGRESS NOTES
Visual Acuity (Snellen - Linear)         Right Left    Dist sc 20/70 CF@face    Dist ph sc 20/60       Correction: Glasses          Tonometry       Tonometry (Goldmann Applanation, 10:28 AM)         Right Left    Pressure 15 19                  Assessment/Plan   Last dilated: 9/26/24  Last gonio 1/9/25:  OD D45r 2+ PTM   OS D45r 2+ PTM    1.  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  Pt states that she had cataract and glaucoma surgery, but no internal osteum nor MIGs implant/incision in TM seen.  No trab seen either.  After stopping brimonidine, the follicles are improved.  IOP measurements are VERY sensitive to blepharospasm and digital pressure.      Now that she is off rhopressa and off preservatives, her eyes are much more comfortable.  The conj thickening has nearly resolved and redness is much improved.  Pt did not have SLT b/c she was scared to have it and wanted to see if she got her systemic BP under control if the IOP would improve.  Pt has a lot medication allergies and was reluctant for SLT.         SLT OS 3/18/24 pre-laser IOP = 19 mmHg -> 4 mmHg effect.  IOPs are now well controlled and RNFL and HVF were both stable.  Pt's IOP has gone back up OS.  Discussed options 1) CPM, 2) alphagan-P, 3) repeat SLT      Plan:  cont cosopt PF OU BID                cont zioptan OU QHS                Start alphagan-P 0.1% OU BID                f/u 1 month     2.  Pseudophakia (PCIOL) OD / Cataract OS:  no PCO seen; not visually significant OS at this time, but if the VA starts to drop due to increasing density, could consider removal in the future.      Plan:  monitor

## 2025-02-24 ENCOUNTER — APPOINTMENT (OUTPATIENT)
Dept: OPHTHALMOLOGY | Facility: CLINIC | Age: 80
End: 2025-02-24
Payer: MEDICARE

## 2025-02-24 DIAGNOSIS — H40.1133 PRIMARY OPEN-ANGLE GLAUCOMA, BILATERAL, SEVERE STAGE: Primary | ICD-10-CM

## 2025-02-24 DIAGNOSIS — H40.1132 PRIMARY OPEN ANGLE GLAUCOMA (POAG) OF BOTH EYES, MODERATE STAGE: ICD-10-CM

## 2025-02-24 DIAGNOSIS — H40.1133 PRIMARY OPEN-ANGLE GLAUCOMA, BILATERAL, SEVERE STAGE: ICD-10-CM

## 2025-02-24 DIAGNOSIS — Z96.1 PSEUDOPHAKIA OF BOTH EYES: ICD-10-CM

## 2025-02-24 PROCEDURE — 99214 OFFICE O/P EST MOD 30 MIN: CPT | Performed by: OPHTHALMOLOGY

## 2025-02-24 RX ORDER — DORZOLAMIDE HYDROCHLORIDE AND TIMOLOL MALEATE 20; 5 MG/ML; MG/ML
1 SOLUTION/ DROPS OPHTHALMIC 2 TIMES DAILY
Qty: 30 ML | Refills: 3 | Status: SHIPPED | OUTPATIENT
Start: 2025-02-24 | End: 2026-02-24

## 2025-02-24 ASSESSMENT — SLIT LAMP EXAM - LIDS
COMMENTS: GOOD POSITION
COMMENTS: GOOD POSITION

## 2025-02-24 ASSESSMENT — TONOMETRY
OS_IOP_MMHG: 19
OD_IOP_MMHG: 14
IOP_METHOD: GOLDMANN APPLANATION

## 2025-02-24 ASSESSMENT — VISUAL ACUITY
CORRECTION_TYPE: GLASSES
METHOD: SNELLEN - LINEAR
OS_SC: HM
OD_SC: 20/100
OD_PH_SC: 20/60

## 2025-02-24 ASSESSMENT — CUP TO DISC RATIO
OS_RATIO: 0.99
OD_RATIO: 0.9

## 2025-02-24 ASSESSMENT — EXTERNAL EXAM - RIGHT EYE: OD_EXAM: NORMAL

## 2025-02-24 ASSESSMENT — PACHYMETRY
OS_CT(UM): 505
OD_CT(UM): 478

## 2025-02-24 ASSESSMENT — EXTERNAL EXAM - LEFT EYE: OS_EXAM: NORMAL

## 2025-02-24 NOTE — PROGRESS NOTES
Visual Acuity (Snellen - Linear)         Right Left    Dist sc 20/100 HM    Dist ph sc 20/60       Correction: Glasses          Tonometry       Tonometry (Goldmann Applanation, 9:50 AM)         Right Left    Pressure 14 19                  Assessment/Plan     Visual Acuity (Snellen - Linear)         Right Left    Dist sc 20/70 CF@face    Dist ph sc 20/60       Correction: Glasses          Tonometry       Tonometry (Goldmann Applanation, 10:28 AM)         Right Left    Pressure 15 19                  Assessment/Plan   Last dilated: 9/26/24  Last gonio 1/9/25:  OD D45r 2+ PTM   OS D45r 2+ PTM    1.  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  Pt states that she had cataract and glaucoma surgery, but no internal osteum nor MIGs implant/incision in TM seen.  No trab seen either.  After stopping brimonidine, the follicles are improved.  IOP measurements are VERY sensitive to blepharospasm and digital pressure.      Now that she is off rhopressa and off preservatives, her eyes are much more comfortable.  The conj thickening has nearly resolved and redness is much improved.  Pt did not have SLT b/c she was scared to have it and wanted to see if she got her systemic BP under control if the IOP would improve.  Pt has a lot medication allergies and was reluctant for SLT.         SLT OS 3/18/24 pre-laser IOP = 19 mmHg -> 4 mmHg effect.  IOPs are now well controlled and RNFL and HVF were both stable.  Pt's IOP has gone back up OS.  Alphagan-P -> no effect Discussed options 1) CPM, 2) repeat SLT OS, 3) incisional surgery.  R/B/A reviewed.  Pt would like to try to keep remaining vision OS.      Plan:  cont cosopt PF OU BID                cont zioptan OU QHS                D/c alphagan-P 0.1%                pt would like to repeat SLT OS (LEFT)    2.  Pseudophakia (PCIOL) OD / Cataract OS:  no PCO seen; not visually significant OS at this time, but if the VA starts to drop due to increasing density, could consider  removal in the future.      Plan:  monitor

## 2025-02-25 DIAGNOSIS — H40.1133 PRIMARY OPEN-ANGLE GLAUCOMA, BILATERAL, SEVERE STAGE: ICD-10-CM

## 2025-02-25 RX ORDER — TAFLUPROST OPTHALMIC 0 MG/.3ML
1 SOLUTION/ DROPS OPHTHALMIC NIGHTLY
Qty: 7.5 EACH | Refills: 3 | Status: SHIPPED | OUTPATIENT
Start: 2025-02-25 | End: 2025-03-27

## 2025-03-14 DIAGNOSIS — I10 PRIMARY HYPERTENSION: ICD-10-CM

## 2025-03-14 RX ORDER — HYDROCHLOROTHIAZIDE 12.5 MG/1
12.5 CAPSULE ORAL DAILY
Qty: 90 CAPSULE | Refills: 1 | Status: SHIPPED | OUTPATIENT
Start: 2025-03-14

## 2025-03-17 ENCOUNTER — APPOINTMENT (OUTPATIENT)
Dept: OPHTHALMOLOGY | Facility: CLINIC | Age: 80
End: 2025-03-17
Payer: MEDICARE

## 2025-03-17 ENCOUNTER — PHARMACY VISIT (OUTPATIENT)
Dept: PHARMACY | Facility: CLINIC | Age: 80
End: 2025-03-17
Payer: COMMERCIAL

## 2025-03-17 DIAGNOSIS — H40.1133 PRIMARY OPEN-ANGLE GLAUCOMA, BILATERAL, SEVERE STAGE: ICD-10-CM

## 2025-03-17 DIAGNOSIS — H40.1123 PRIMARY OPEN ANGLE GLAUCOMA OF LEFT EYE, SEVERE STAGE: Primary | ICD-10-CM

## 2025-03-17 PROCEDURE — RXMED WILLOW AMBULATORY MEDICATION CHARGE

## 2025-03-17 PROCEDURE — 65855 TRABECULOPLASTY LASER SURG: CPT | Mod: LEFT SIDE | Performed by: OPHTHALMOLOGY

## 2025-03-17 RX ORDER — PREDNISOLONE ACETATE 10 MG/ML
1 SUSPENSION/ DROPS OPHTHALMIC 4 TIMES DAILY
Qty: 5 ML | Refills: 0 | Status: SHIPPED | OUTPATIENT
Start: 2025-03-17 | End: 2025-04-04

## 2025-03-17 NOTE — PROGRESS NOTES
Assessment/Plan   Last dilated: 9/26/24  Last gonio 1/9/25:  OD D45r 2+ PTM   OS D45r 2+ PTM    1.  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  Pt states that she had cataract and glaucoma surgery, but no internal osteum nor MIGs implant/incision in TM seen.  No trab seen either.  After stopping brimonidine, the follicles are improved.  IOP measurements are VERY sensitive to blepharospasm and digital pressure.      Now that she is off rhopressa and off preservatives, her eyes are much more comfortable.  The conj thickening has nearly resolved and redness is much improved.  Pt did not have SLT b/c she was scared to have it and wanted to see if she got her systemic BP under control if the IOP would improve.  Pt has a lot medication allergies and was reluctant for SLT.         SLT OS 3/18/24 pre-laser IOP = 19 mmHg -> 4 mmHg effect.  IOPs are now well controlled and RNFL and HVF were both stable.  Pt's IOP has gone back up OS.  Alphagan-P -> no effect.  Previously discussed options 1) CPM, 2) repeat SLT OS, 3) incisional surgery.  R/B/A reviewed.  Pt would like to try to keep remaining vision OS.      Plan:  cont cosopt PF OU BID                cont zioptan OU QHS                D/c alphagan-P 0.1%                pt would like to repeat SLT OS (LEFT) today 3/17/25, prelaser IOP = 19 mmHg.    2.  Pseudophakia (PCIOL) OD / Cataract OS:  no PCO seen; not visually significant OS at this time, but if the VA starts to drop due to increasing density, could consider removal in the future.      Plan:  monitor

## 2025-04-02 ENCOUNTER — APPOINTMENT (OUTPATIENT)
Dept: OPHTHALMOLOGY | Facility: CLINIC | Age: 80
End: 2025-04-02
Payer: MEDICARE

## 2025-04-02 DIAGNOSIS — H40.1133 PRIMARY OPEN-ANGLE GLAUCOMA, BILATERAL, SEVERE STAGE: Primary | ICD-10-CM

## 2025-04-02 PROCEDURE — 99213 OFFICE O/P EST LOW 20 MIN: CPT | Performed by: OPHTHALMOLOGY

## 2025-04-02 ASSESSMENT — PACHYMETRY
OD_CT(UM): 478
OS_CT(UM): 505

## 2025-04-02 ASSESSMENT — EXTERNAL EXAM - RIGHT EYE: OD_EXAM: NORMAL

## 2025-04-02 ASSESSMENT — EXTERNAL EXAM - LEFT EYE: OS_EXAM: NORMAL

## 2025-04-02 ASSESSMENT — CUP TO DISC RATIO
OS_RATIO: 0.99
OD_RATIO: 0.9

## 2025-04-02 ASSESSMENT — SLIT LAMP EXAM - LIDS
COMMENTS: GOOD POSITION
COMMENTS: GOOD POSITION

## 2025-04-02 ASSESSMENT — VISUAL ACUITY
OD_SC: 20/70
METHOD: SNELLEN - LINEAR
OD_PH_SC: 20/50

## 2025-04-02 ASSESSMENT — TONOMETRY
OS_IOP_MMHG: 18
OD_IOP_MMHG: 12
IOP_METHOD: GOLDMANN APPLANATION

## 2025-04-02 NOTE — PROGRESS NOTES
Visual Acuity (Snellen - Linear)         Right Left    Dist sc 20/70 HM at face    Dist ph sc 20/50           Tonometry       Tonometry (Goldmann Applanation, 10:43 AM)         Right Left    Pressure 12 18                  Assessment/Plan   Last dilated: 9/26/24  Last gonio 1/9/25:  OD D45r 2+ PTM   OS D45r 2+ PTM     1.  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  Pt states that she had cataract and glaucoma surgery, but no internal osteum nor MIGs implant/incision in TM seen.  No trab seen either.  After stopping brimonidine, the follicles are improved.  IOP measurements are VERY sensitive to blepharospasm and digital pressure.      Now that she is off rhopressa and off preservatives, her eyes are much more comfortable.  The conj thickening has nearly resolved and redness is much improved.  Pt did not have SLT b/c she was scared to have it and wanted to see if she got her systemic BP under control if the IOP would improve.  Pt has a lot medication allergies and was reluctant for SLT.         SLT OS 3/18/24 pre-laser IOP = 19 mmHg -> 4 mmHg effect.  IOPs are now well controlled and RNFL and HVF were both stable.  Pt's IOP has gone back up OS.  Alphagan-P -> no effect.       S/p repeat SLT OS (LEFT) 3/17/25, prelaser IOP = 19 mmHg -> 1 mmHg early effect.      Plan:  cont cosopt PF OU BID                cont zioptan OU qhs                F/u 1 month to see full effect of SLT OS      2.  Pseudophakia (PCIOL) OD / Cataract OS:  no PCO seen; not visually significant OS at this time, but if the VA starts to drop due to increasing density, could consider removal in the future.      Plan:  monitor

## 2025-04-03 ENCOUNTER — APPOINTMENT (OUTPATIENT)
Dept: OPHTHALMOLOGY | Facility: CLINIC | Age: 80
End: 2025-04-03
Payer: MEDICARE

## 2025-05-07 ENCOUNTER — APPOINTMENT (OUTPATIENT)
Dept: OPHTHALMOLOGY | Facility: CLINIC | Age: 80
End: 2025-05-07
Payer: MEDICARE

## 2025-05-07 DIAGNOSIS — Z96.1 PSEUDOPHAKIA OF BOTH EYES: ICD-10-CM

## 2025-05-07 DIAGNOSIS — H40.1133 PRIMARY OPEN-ANGLE GLAUCOMA, BILATERAL, SEVERE STAGE: Primary | ICD-10-CM

## 2025-05-07 PROCEDURE — 99213 OFFICE O/P EST LOW 20 MIN: CPT | Performed by: OPHTHALMOLOGY

## 2025-05-07 RX ORDER — TAFLUPROST OPTHALMIC 0 MG/.3ML
1 SOLUTION/ DROPS OPHTHALMIC NIGHTLY
COMMUNITY

## 2025-05-07 ASSESSMENT — EXTERNAL EXAM - LEFT EYE: OS_EXAM: NORMAL

## 2025-05-07 ASSESSMENT — CUP TO DISC RATIO
OS_RATIO: 0.99
OD_RATIO: 0.9

## 2025-05-07 ASSESSMENT — SLIT LAMP EXAM - LIDS
COMMENTS: GOOD POSITION
COMMENTS: GOOD POSITION

## 2025-05-07 ASSESSMENT — ENCOUNTER SYMPTOMS
PSYCHIATRIC NEGATIVE: 0
EYES NEGATIVE: 1
CARDIOVASCULAR NEGATIVE: 0
ALLERGIC/IMMUNOLOGIC NEGATIVE: 0
NEUROLOGICAL NEGATIVE: 0
RESPIRATORY NEGATIVE: 0
MUSCULOSKELETAL NEGATIVE: 0
ENDOCRINE NEGATIVE: 0
CONSTITUTIONAL NEGATIVE: 0
HEMATOLOGIC/LYMPHATIC NEGATIVE: 0
GASTROINTESTINAL NEGATIVE: 0

## 2025-05-07 ASSESSMENT — REFRACTION_WEARINGRX
OS_ADD: +2.75
OS_AXIS: 140
OD_AXIS: 158
OS_SPHERE: -1.50
OS_CYLINDER: -0.50
OD_CYLINDER: -0.50
OD_SPHERE: -0.50
OD_ADD: +2.75

## 2025-05-07 ASSESSMENT — VISUAL ACUITY
OS_SC: HM
OD_SC: 20/60
METHOD: SNELLEN - LINEAR

## 2025-05-07 ASSESSMENT — TONOMETRY
IOP_METHOD: GOLDMANN APPLANATION
OD_IOP_MMHG: 17
OS_IOP_MMHG: 24

## 2025-05-07 ASSESSMENT — CONF VISUAL FIELD
OS_SUPERIOR_NASAL_RESTRICTION: 1
OD_SUPERIOR_TEMPORAL_RESTRICTION: 1
OS_SUPERIOR_TEMPORAL_RESTRICTION: 1
METHOD: COUNTING FINGERS
OS_INFERIOR_TEMPORAL_RESTRICTION: 1
OD_SUPERIOR_NASAL_RESTRICTION: 1
OS_INFERIOR_NASAL_RESTRICTION: 1

## 2025-05-07 ASSESSMENT — PACHYMETRY
OD_CT(UM): 478
OS_CT(UM): 505

## 2025-05-07 ASSESSMENT — EXTERNAL EXAM - RIGHT EYE: OD_EXAM: NORMAL

## 2025-05-14 ENCOUNTER — APPOINTMENT (OUTPATIENT)
Dept: OPHTHALMOLOGY | Facility: CLINIC | Age: 80
End: 2025-05-14
Payer: MEDICARE

## 2025-05-14 DIAGNOSIS — H40.1133 PRIMARY OPEN-ANGLE GLAUCOMA, BILATERAL, SEVERE STAGE: Primary | ICD-10-CM

## 2025-05-14 DIAGNOSIS — Z96.1 PSEUDOPHAKIA OF BOTH EYES: ICD-10-CM

## 2025-05-14 PROCEDURE — 99213 OFFICE O/P EST LOW 20 MIN: CPT | Performed by: OPHTHALMOLOGY

## 2025-05-14 ASSESSMENT — SLIT LAMP EXAM - LIDS
COMMENTS: GOOD POSITION
COMMENTS: GOOD POSITION

## 2025-05-14 ASSESSMENT — VISUAL ACUITY
CORRECTION_TYPE: GLASSES
METHOD: SNELLEN - LINEAR
OD_CC: 20/70-1
OD_PH_CC: 2040-2
OS_CC: LP

## 2025-05-14 ASSESSMENT — PACHYMETRY
OS_CT(UM): 505
OD_CT(UM): 478

## 2025-05-14 ASSESSMENT — EXTERNAL EXAM - LEFT EYE: OS_EXAM: NORMAL

## 2025-05-14 ASSESSMENT — TONOMETRY
IOP_METHOD: GOLDMANN APPLANATION
OD_IOP_MMHG: 11
OS_IOP_MMHG: 25

## 2025-05-14 ASSESSMENT — EXTERNAL EXAM - RIGHT EYE: OD_EXAM: NORMAL

## 2025-05-14 ASSESSMENT — CUP TO DISC RATIO
OD_RATIO: 0.9
OS_RATIO: 0.99

## 2025-05-14 NOTE — PROGRESS NOTES
Visual Acuity (Snellen - Linear)         Right Left    Dist cc 20/70-1 LP    Dist ph cc 2040-2       Correction: Glasses          Tonometry       Tonometry (Goldmann Applanation, 10:29 AM)         Right Left    Pressure 11 25                  Assessment/Plan   Last dilated: 9/26/24  Last gonio 5/14/25:  OD D45r 2+ PTM   OS D45r 2+ PTM, no NVA     1.  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  Pt states that she had cataract and glaucoma surgery, but no internal osteum nor MIGs implant/incision in TM seen.  No trab seen either.  After stopping brimonidine, the follicles are improved.  IOP measurements are VERY sensitive to blepharospasm and digital pressure.      Now that she is off rhopressa and off preservatives, her eyes are much more comfortable.  The conj thickening has nearly resolved and redness is much improved.  Pt did not have SLT b/c she was scared to have it and wanted to see if she got her systemic BP under control if the IOP would improve.  Pt has a lot medication allergies and was reluctant for SLT.         SLT OS 3/18/24 pre-laser IOP = 19 mmHg -> 4 mmHg effect.  IOPs are now well controlled and RNFL and HVF were both stable.  Pt's IOP has gone back up OS.  Alphagan-P -> no effect.       S/p repeat SLT OS (LEFT) 3/17/25, prelaser IOP = 19 mmHg -> 1 mmHg early effect.  Pt had issues with compliance last visit and with improved drop utilization, OD is back under control.  Left eye, despite the initial 1 mmHg drop, is elevated and remains elevated.  There is a small amount of inflammation OS.  Discussed with pt and  if OS vision is worth saving.  Pt clearly states that she is not interested in surgical intervention and is ok with letting the last remaining vision OS fade away (5/14/25).  We will treat the inflammation.      Plan:  cont cosopt PF OU BID                cont zioptan OU qhs                Re-start prednisolone acetate OS QD                F/u 2 weeks     2.  Pseudophakia  (PCIOL) OD / Cataract OS:  no PCO seen; not visually significant OS at this time, but if the VA starts to drop due to increasing density, could consider removal in the future.      Plan:  monitor

## 2025-05-28 ENCOUNTER — APPOINTMENT (OUTPATIENT)
Dept: OPHTHALMOLOGY | Facility: CLINIC | Age: 80
End: 2025-05-28
Payer: MEDICARE

## 2025-05-28 DIAGNOSIS — H25.812 COMBINED FORMS OF AGE-RELATED CATARACT OF LEFT EYE: ICD-10-CM

## 2025-05-28 DIAGNOSIS — H40.1133 PRIMARY OPEN-ANGLE GLAUCOMA, BILATERAL, SEVERE STAGE: Primary | ICD-10-CM

## 2025-05-28 PROCEDURE — 99213 OFFICE O/P EST LOW 20 MIN: CPT | Performed by: OPHTHALMOLOGY

## 2025-05-28 ASSESSMENT — CUP TO DISC RATIO
OD_RATIO: 0.9
OS_RATIO: 0.99

## 2025-05-28 ASSESSMENT — VISUAL ACUITY
OD_SC: 20/50
OS_SC: 20/LP
OD_SC+: +1
CORRECTION_TYPE: GLASSES
METHOD: SNELLEN - LINEAR

## 2025-05-28 ASSESSMENT — ENCOUNTER SYMPTOMS
PSYCHIATRIC NEGATIVE: 0
CONSTITUTIONAL NEGATIVE: 0
ALLERGIC/IMMUNOLOGIC NEGATIVE: 0
MUSCULOSKELETAL NEGATIVE: 0
RESPIRATORY NEGATIVE: 0
HEMATOLOGIC/LYMPHATIC NEGATIVE: 0
ENDOCRINE NEGATIVE: 0
GASTROINTESTINAL NEGATIVE: 0
EYES NEGATIVE: 1
NEUROLOGICAL NEGATIVE: 0
CARDIOVASCULAR NEGATIVE: 0

## 2025-05-28 ASSESSMENT — TONOMETRY
OS_IOP_MMHG: 26
OD_IOP_MMHG: 15
IOP_METHOD: GOLDMANN APPLANATION

## 2025-05-28 ASSESSMENT — EXTERNAL EXAM - LEFT EYE: OS_EXAM: NORMAL

## 2025-05-28 ASSESSMENT — SLIT LAMP EXAM - LIDS
COMMENTS: GOOD POSITION
COMMENTS: GOOD POSITION

## 2025-05-28 ASSESSMENT — EXTERNAL EXAM - RIGHT EYE: OD_EXAM: NORMAL

## 2025-05-28 ASSESSMENT — PACHYMETRY
OS_CT(UM): 505
OD_CT(UM): 478

## 2025-05-28 NOTE — PROGRESS NOTES
Visual Acuity (Snellen - Linear)         Right Left    Dist sc 20/50 +1 20/LP      Correction: Glasses          Tonometry       Tonometry (Goldmann Applanation, 11:08 AM)         Right Left    Pressure 15 26                  Assessment/Plan   Last dilated: 9/26/24  Last gonio 5/14/25:  OD D45r 2+ PTM   OS D45r 2+ PTM, no NVA     1.  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  Pt states that she had cataract and glaucoma surgery, but no internal osteum nor MIGs implant/incision in TM seen.  No trab seen either.  After stopping brimonidine, the follicles are improved.  IOP measurements are VERY sensitive to blepharospasm and digital pressure.      Now that she is off rhopressa and off preservatives, her eyes are much more comfortable.  The conj thickening has nearly resolved and redness is much improved.  Pt did not have SLT b/c she was scared to have it and wanted to see if she got her systemic BP under control if the IOP would improve.  Pt has a lot medication allergies and was reluctant for SLT.         SLT OS 3/18/24 pre-laser IOP = 19 mmHg -> 4 mmHg effect.  IOPs are now well controlled and RNFL and HVF were both stable.  Pt's IOP has gone back up OS.  Alphagan-P -> no effect.       S/p repeat SLT OS (LEFT) 3/17/25, prelaser IOP = 19 mmHg -> 1 mmHg early effect.  Pt had issues with compliance last visit and with improved drop utilization, OD is back under control.  Left eye, despite the initial 1 mmHg drop, is elevated and remains elevated.  There is a small amount of inflammation OS.  Discussed with pt and  if OS vision is worth saving.  Pt clearly states that she is not interested in surgical intervention and is ok with letting the last remaining vision OS fade away (5/14/25).  Early May 2025 had 1-2+ WBC, with prednisolone QD, anterior chamber (AC) is now clear, will start to taper prednisolone      Plan:  cont cosopt PF OU BID                cont zioptan OU qhs                decrease  prednisolone acetate OS QD -> QOD                F/u 1 month     2.  Pseudophakia (PCIOL) OD / Cataract OS:  no PCO seen; not visually significant OS at this time, but if the VA starts to drop due to increasing density, could consider removal in the future.      Plan:  monitor

## 2025-06-09 DIAGNOSIS — H40.1133 PRIMARY OPEN-ANGLE GLAUCOMA, BILATERAL, SEVERE STAGE: ICD-10-CM

## 2025-06-09 DIAGNOSIS — H40.1132 PRIMARY OPEN ANGLE GLAUCOMA (POAG) OF BOTH EYES, MODERATE STAGE: ICD-10-CM

## 2025-06-09 RX ORDER — DORZOLAMIDE HYDROCHLORIDE AND TIMOLOL MALEATE 20; 5 MG/ML; MG/ML
1 SOLUTION/ DROPS OPHTHALMIC 2 TIMES DAILY
Qty: 30 ML | Refills: 3 | Status: SHIPPED | OUTPATIENT
Start: 2025-06-09 | End: 2026-06-09

## 2025-06-27 DIAGNOSIS — H40.1133 PRIMARY OPEN-ANGLE GLAUCOMA, BILATERAL, SEVERE STAGE: ICD-10-CM

## 2025-06-27 RX ORDER — TAFLUPROST OPTHALMIC 0 MG/.3ML
1 SOLUTION/ DROPS OPHTHALMIC NIGHTLY
Qty: 7.5 EACH | Refills: 3 | Status: SHIPPED | OUTPATIENT
Start: 2025-06-27 | End: 2025-07-27

## 2025-07-09 ENCOUNTER — APPOINTMENT (OUTPATIENT)
Dept: OPHTHALMOLOGY | Facility: CLINIC | Age: 80
End: 2025-07-09
Payer: MEDICARE

## 2025-07-09 DIAGNOSIS — H40.1123 PRIMARY OPEN ANGLE GLAUCOMA OF LEFT EYE, SEVERE STAGE: ICD-10-CM

## 2025-07-09 DIAGNOSIS — H40.1133 PRIMARY OPEN-ANGLE GLAUCOMA, BILATERAL, SEVERE STAGE: Primary | ICD-10-CM

## 2025-07-09 DIAGNOSIS — H25.812 COMBINED FORMS OF AGE-RELATED CATARACT OF LEFT EYE: ICD-10-CM

## 2025-07-09 PROCEDURE — 99213 OFFICE O/P EST LOW 20 MIN: CPT | Performed by: OPHTHALMOLOGY

## 2025-07-09 ASSESSMENT — VISUAL ACUITY
OS_SC: BARE LP
OD_PH_SC+: -1
METHOD: SNELLEN - LINEAR

## 2025-07-09 ASSESSMENT — TONOMETRY
OS_IOP_MMHG: 26
OD_IOP_MMHG: 13
IOP_METHOD: GOLDMANN APPLANATION

## 2025-07-09 ASSESSMENT — CONF VISUAL FIELD
OS_INFERIOR_NASAL_RESTRICTION: 1
OS_SUPERIOR_NASAL_RESTRICTION: 1
OS_INFERIOR_TEMPORAL_RESTRICTION: 1
OS_SUPERIOR_TEMPORAL_RESTRICTION: 1

## 2025-07-09 ASSESSMENT — SLIT LAMP EXAM - LIDS
COMMENTS: GOOD POSITION
COMMENTS: GOOD POSITION

## 2025-07-09 ASSESSMENT — CUP TO DISC RATIO
OS_RATIO: 0.99
OD_RATIO: 0.9

## 2025-07-09 ASSESSMENT — PACHYMETRY
OD_CT(UM): 478
OS_CT(UM): 505

## 2025-07-09 ASSESSMENT — EXTERNAL EXAM - LEFT EYE: OS_EXAM: NORMAL

## 2025-07-09 ASSESSMENT — EXTERNAL EXAM - RIGHT EYE: OD_EXAM: NORMAL

## 2025-07-09 NOTE — PROGRESS NOTES
Visual Acuity (Snellen - Linear)         Right Left    Dist sc 20/50 (slow) bare LP    Dist ph sc 20/40 (slow) -1           Tonometry       Tonometry (Goldmann Applanation, 10:55 AM)         Right Left    Pressure 13 26                  Assessment/Plan   Last dilated: 9/26/24  Last gonio 5/14/25:  OD D45r 2+ PTM   OS D45r 2+ PTM, no NVA     1.  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  Pt states that she had cataract and glaucoma surgery, but no internal osteum nor MIGs implant/incision in TM seen.  No trab seen either.  After stopping brimonidine, the follicles are improved.  IOP measurements are VERY sensitive to blepharospasm and digital pressure.      Now that she is off rhopressa and off preservatives, her eyes are much more comfortable.  The conj thickening has nearly resolved and redness is much improved.  Pt did not have SLT b/c she was scared to have it and wanted to see if she got her systemic BP under control if the IOP would improve.  Pt has a lot medication allergies and was reluctant for SLT.         SLT OS 3/18/24 pre-laser IOP = 19 mmHg -> 4 mmHg effect.  IOPs are now well controlled and RNFL and HVF were both stable.  Pt's IOP has gone back up OS.  Alphagan-P -> no effect.       S/p repeat SLT OS (LEFT) 3/17/25, prelaser IOP = 19 mmHg -> 1 mmHg early effect.  Pt had issues with compliance last visit and with improved drop utilization, OD is back under control.  Left eye, despite the initial 1 mmHg drop, is elevated and remains elevated.  There is a small amount of inflammation OS.  Discussed with pt and  if OS vision is worth saving.  Pt clearly states that she is not interested in surgical intervention and is ok with letting the last remaining vision OS fade away (5/14/25).  Early May 2025 had 1-2+ WBC, with prednisolone QD, anterior chamber (AC) is now clear, will start to taper prednisolone      Plan:  cont cosopt PF OU BID                cont zioptan OU qhs                 cont prednisolone acetate OS QOD                F/u 2 months (HVF, dilation, RNFL)     2.  Pseudophakia (PCIOL) OD / Cataract OS:  no PCO seen; not visually significant OS at this time, but if the VA starts to drop due to increasing density, could consider removal in the future.      Plan:  monitor

## 2025-09-11 ENCOUNTER — APPOINTMENT (OUTPATIENT)
Dept: OPHTHALMOLOGY | Facility: CLINIC | Age: 80
End: 2025-09-11
Payer: MEDICARE

## 2025-09-24 ENCOUNTER — APPOINTMENT (OUTPATIENT)
Dept: OPHTHALMOLOGY | Facility: CLINIC | Age: 80
End: 2025-09-24
Payer: MEDICARE

## 2025-11-05 ENCOUNTER — APPOINTMENT (OUTPATIENT)
Dept: PRIMARY CARE | Facility: CLINIC | Age: 80
End: 2025-11-05
Payer: MEDICARE